# Patient Record
Sex: MALE | URBAN - METROPOLITAN AREA
[De-identification: names, ages, dates, MRNs, and addresses within clinical notes are randomized per-mention and may not be internally consistent; named-entity substitution may affect disease eponyms.]

---

## 2018-03-14 PROBLEM — 266435005 GASTRO-ESOPHAGEAL REFLUX DISEASE WITHOUT ESOPHAGITIS: Status: ACTIVE | Noted: 2018-03-14

## 2018-05-24 PROBLEM — 266433003 GASTRO-ESOPHAGEAL REFLUX DISEASE WITH ESOPHAGITIS: Status: ACTIVE | Noted: 2018-05-24

## 2020-10-07 ENCOUNTER — OFFICE VISIT (OUTPATIENT)
Dept: URBAN - METROPOLITAN AREA CLINIC 31 | Facility: CLINIC | Age: 65
End: 2020-10-07

## 2020-10-07 VITALS
HEIGHT: 72 IN | BODY MASS INDEX: 28.58 KG/M2 | SYSTOLIC BLOOD PRESSURE: 110 MMHG | HEART RATE: 78 BPM | WEIGHT: 211 LBS | OXYGEN SATURATION: 98 % | DIASTOLIC BLOOD PRESSURE: 70 MMHG

## 2020-10-07 RX ORDER — DICLOFENAC SODIUM 75 MG/1
1 TABLET TABLET, DELAYED RELEASE ORAL
Status: ACTIVE | COMMUNITY

## 2020-10-07 RX ORDER — OMEPRAZOLE 40 MG/1
TAKE 1 CAPSULE BEFORE BREAKFAST CAPSULE, DELAYED RELEASE ORAL QAM
Qty: 90 | Refills: 4

## 2020-10-07 RX ORDER — FLUTICASONE PROPIONATE 50 UG/1
1 SPRAY IN EACH NOSTRIL SPRAY, METERED NASAL ONCE A DAY
Status: ACTIVE | COMMUNITY

## 2020-10-07 RX ORDER — OLMESARTAN MEDOXOMIL 20 MG/1
1 TABLET TABLET, FILM COATED ORAL ONCE A DAY
Status: ACTIVE | COMMUNITY

## 2020-10-07 RX ORDER — OMEPRAZOLE 40 MG/1
TAKE 1 CAPSULE BEFORE BREAKFAST CAPSULE, DELAYED RELEASE ORAL QAM
Qty: 90 | Refills: 4 | Status: ACTIVE | COMMUNITY

## 2020-10-07 NOTE — HPI-MIGRATED HPI
;     [facility] : Patient presents today for a follow-up office visit from 10/02/2019. Patient continues taking Omeprazole 40 mg, once a day. He is NOT currently avoiding NSAID's. Patient is taking Diclofenac Sodium, 75 mg, to be taken once or twice a day. This was prescribed for knee and hip pain. Patient states that he is feeling well today with no complaints. Patient denies any heartburn, early satiety, or acid reflux. As long as he takes the Omeprazole, he does not report symptoms. Patient has some concerns of long tern use of Omeprazole and would like to discuss any possible side effects. He states that he is not sure if he has previously taken Pepcid, if he did, it was a long time ago.  ;

## 2021-10-06 ENCOUNTER — OFFICE VISIT (OUTPATIENT)
Dept: URBAN - METROPOLITAN AREA CLINIC 35 | Facility: CLINIC | Age: 66
End: 2021-10-06

## 2021-10-06 VITALS
SYSTOLIC BLOOD PRESSURE: 116 MMHG | DIASTOLIC BLOOD PRESSURE: 68 MMHG | HEART RATE: 83 BPM | HEIGHT: 72 IN | OXYGEN SATURATION: 96 % | WEIGHT: 217 LBS | BODY MASS INDEX: 29.39 KG/M2

## 2021-10-06 RX ORDER — ATORVASTATIN CALCIUM 20 MG/1
1 TABLET TABLET, FILM COATED ORAL ONCE A DAY
Qty: 30 | Status: ACTIVE | COMMUNITY

## 2021-10-06 RX ORDER — DICLOFENAC SODIUM 75 MG/1
1 TABLET TABLET, DELAYED RELEASE ORAL
Status: ACTIVE | COMMUNITY

## 2021-10-06 RX ORDER — OMEPRAZOLE 40 MG/1
TAKE 1 CAPSULE BEFORE BREAKFAST CAPSULE, DELAYED RELEASE ORAL QAM
Qty: 90 | Refills: 4

## 2021-10-06 RX ORDER — OLMESARTAN MEDOXOMIL 20 MG/1
1 TABLET TABLET, FILM COATED ORAL ONCE A DAY
Status: ACTIVE | COMMUNITY

## 2021-10-06 RX ORDER — FLUTICASONE PROPIONATE 50 UG/1
1 SPRAY IN EACH NOSTRIL SPRAY, METERED NASAL ONCE A DAY
Status: ACTIVE | COMMUNITY

## 2021-10-06 RX ORDER — OMEPRAZOLE 40 MG/1
TAKE 1 CAPSULE BEFORE BREAKFAST CAPSULE, DELAYED RELEASE ORAL QAM
Qty: 90 | Refills: 4 | Status: ACTIVE | COMMUNITY

## 2021-10-06 NOTE — HPI-MIGRATED HPI
;     [facility] : Patient presents today for a follow-up office visit from 10/07/2020. Patient continues taking Omeprazole 40 mg, once a day. He is NOT currently avoiding NSAID's. Patient is taking Diclofenac Sodium, 75 mg, to be taken once or twice a day. This was prescribed for knee pain and he us seeing a surgeon next week. Patient states that he is feeling well today with no complaints. Patient denies any heartburn, early satiety, or acid reflux. Patient did not complete the bone densitometry. He wonders if it is necessary as he does not have a known family history of osteoporosis. He is willing to get the test done, if recommended. Patient requests refills of Omeprazole 40 mg, daily, sent to confirmed pharmacy in EMR;

## 2021-11-08 ENCOUNTER — TELEPHONE ENCOUNTER (OUTPATIENT)
Dept: URBAN - METROPOLITAN AREA CLINIC 35 | Facility: CLINIC | Age: 66
End: 2021-11-08

## 2022-10-05 ENCOUNTER — OFFICE VISIT (OUTPATIENT)
Dept: URBAN - METROPOLITAN AREA CLINIC 35 | Facility: CLINIC | Age: 67
End: 2022-10-05

## 2022-11-09 ENCOUNTER — OFFICE VISIT (OUTPATIENT)
Dept: URBAN - METROPOLITAN AREA CLINIC 31 | Facility: CLINIC | Age: 67
End: 2022-11-09
Payer: MEDICARE

## 2022-11-09 VITALS
SYSTOLIC BLOOD PRESSURE: 116 MMHG | BODY MASS INDEX: 29.12 KG/M2 | WEIGHT: 215 LBS | HEIGHT: 72 IN | DIASTOLIC BLOOD PRESSURE: 70 MMHG | OXYGEN SATURATION: 97 % | HEART RATE: 97 BPM

## 2022-11-09 DIAGNOSIS — K29.81 DUODENITIS WITH BLEEDING: ICD-10-CM

## 2022-11-09 DIAGNOSIS — K22.70 BARRETT'S ESOPHAGUS WITHOUT DYSPLASIA: ICD-10-CM

## 2022-11-09 DIAGNOSIS — K21.00 GASTRO-ESOPHAGEAL REFLUX DISEASE WITH ESOPHAGITIS, WITHOUT BLEEDING: ICD-10-CM

## 2022-11-09 PROBLEM — 302914006 BARRETT'S ESOPHAGUS: Status: ACTIVE | Noted: 2018-03-14

## 2022-11-09 PROCEDURE — 99213 OFFICE O/P EST LOW 20 MIN: CPT | Performed by: INTERNAL MEDICINE

## 2022-11-09 RX ORDER — OMEPRAZOLE 40 MG/1
TAKE 1 CAPSULE BEFORE BREAKFAST CAPSULE, DELAYED RELEASE ORAL QAM
Qty: 90 | Refills: 4 | OUTPATIENT

## 2022-11-09 RX ORDER — FLUTICASONE PROPIONATE 50 UG/1
1 SPRAY IN EACH NOSTRIL SPRAY, METERED NASAL ONCE A DAY
Status: DISCONTINUED | COMMUNITY

## 2022-11-09 RX ORDER — ATORVASTATIN CALCIUM 20 MG/1
1 TABLET TABLET, FILM COATED ORAL ONCE A DAY
Qty: 30 | Status: ACTIVE | COMMUNITY

## 2022-11-09 RX ORDER — OMEPRAZOLE 40 MG/1
TAKE 1 CAPSULE BEFORE BREAKFAST CAPSULE, DELAYED RELEASE ORAL QAM
Qty: 90 | Refills: 4 | Status: DISCONTINUED | COMMUNITY

## 2022-11-09 RX ORDER — OLMESARTAN MEDOXOMIL 20 MG/1
1 TABLET TABLET, FILM COATED ORAL ONCE A DAY
Status: ACTIVE | COMMUNITY

## 2022-11-09 RX ORDER — DICLOFENAC SODIUM 75 MG/1
1 TABLET TABLET, DELAYED RELEASE ORAL
Status: DISCONTINUED | COMMUNITY

## 2022-11-09 NOTE — HPI-MIGRATED HPI
Patient presents today for a follow-up office visit from 10/06/2021. The bone density test was completed on 10/27/2021, and the results are in EMR. Patient discontinued taking Omeprazole 40 mg, 6 months ago. Patient states that he is feeling well today with no complaints. Patient denies any heartburn, early satiety, or acid reflux. He denies dysphagia. Patient is currently having 1 bowel movement per day with normal and formed stools. Patient denies any blood or mucous in his stool. He denies melena. Patient is due for a colonoscopy some time in 2023. He would also like to know when he needs to repeat the EGD.

## 2023-06-06 ENCOUNTER — OFFICE VISIT (OUTPATIENT)
Dept: URBAN - METROPOLITAN AREA CLINIC 35 | Facility: CLINIC | Age: 68
End: 2023-06-06
Payer: MEDICARE

## 2023-06-06 VITALS
HEIGHT: 72 IN | DIASTOLIC BLOOD PRESSURE: 78 MMHG | OXYGEN SATURATION: 97 % | WEIGHT: 212.6 LBS | HEART RATE: 75 BPM | SYSTOLIC BLOOD PRESSURE: 120 MMHG | BODY MASS INDEX: 28.79 KG/M2

## 2023-06-06 DIAGNOSIS — K29.81 DUODENITIS WITH BLEEDING: ICD-10-CM

## 2023-06-06 DIAGNOSIS — K21.00 GASTRO-ESOPHAGEAL REFLUX DISEASE WITH ESOPHAGITIS, WITHOUT BLEEDING: ICD-10-CM

## 2023-06-06 DIAGNOSIS — Z12.11 ENCOUNTER FOR COLORECTAL CANCER SCREENING: ICD-10-CM

## 2023-06-06 PROCEDURE — 99214 OFFICE O/P EST MOD 30 MIN: CPT | Performed by: INTERNAL MEDICINE

## 2023-06-06 RX ORDER — OLMESARTAN MEDOXOMIL 20 MG/1
1 TABLET TABLET, FILM COATED ORAL ONCE A DAY
Status: ACTIVE | COMMUNITY

## 2023-06-06 RX ORDER — ATORVASTATIN CALCIUM 20 MG/1
1 TABLET TABLET, FILM COATED ORAL ONCE A DAY
Qty: 30 | Status: ACTIVE | COMMUNITY

## 2023-06-06 RX ORDER — POLYETHYLENE GLYCOL 3350 17 G/17G
AS DIRECTED POWDER, FOR SOLUTION ORAL
OUTPATIENT
Start: 2023-06-06

## 2023-06-06 NOTE — HPI-GERD
Patient presents today for a follow-up of gerd. He remains off of Omeprazole. Patient denies any associated symptoms of heartburn,regurgitation, early satiety, nausea, and vomiting. He denies the use of NSAIDs. Patient's last EGD was done on 04/19/2018, with Dr. Linn.    Last Visit (11/09/2022) : Patient presents today for a follow-up office visit from 10/06/2021. The bone density test was completed on 10/27/2021, and the results are in EMR. Patient discontinued taking Omeprazole 40 mg, 6 months ago. Patient states that he is feeling well today with no complaints. Patient denies any heartburn, early satiety, or acid reflux. He denies dysphagia. Patient is currently having 1 bowel movement per day with normal and formed stools. Patient denies any blood or mucous in his stool. He denies melena. Patient is due for a colonoscopy some time in 2023. He would also like to know when he needs to repeat the EGD.

## 2023-06-06 NOTE — HPI-COLORECTAL CANCER SCREENING
Patient presents today for a colorectal cancer screening. Patient denies this will be his first colonoscopy. Patient states his last colonoscopy was 09.21.2013 by Dr. Linn. He denies a family history of colon, gastric, or esophageal cancer/polyps. Currently reports 1-2 bowel movements per day, without strain. His stools varies between loose/normal  without the presence of blood, mucus, or melena. He denies any episodes of rectal pain or pruritus ani.

## 2023-06-26 ENCOUNTER — TELEPHONE ENCOUNTER (OUTPATIENT)
Dept: URBAN - METROPOLITAN AREA CLINIC 31 | Facility: CLINIC | Age: 68
End: 2023-06-26

## 2023-06-28 ENCOUNTER — CLAIMS CREATED FROM THE CLAIM WINDOW (OUTPATIENT)
Dept: URBAN - METROPOLITAN AREA SURGERY CENTER 8 | Facility: SURGERY CENTER | Age: 68
End: 2023-06-28

## 2023-06-28 ENCOUNTER — CLAIMS CREATED FROM THE CLAIM WINDOW (OUTPATIENT)
Dept: URBAN - METROPOLITAN AREA CLINIC 4 | Facility: CLINIC | Age: 68
End: 2023-06-28
Payer: MEDICARE

## 2023-06-28 ENCOUNTER — CLAIMS CREATED FROM THE CLAIM WINDOW (OUTPATIENT)
Dept: URBAN - METROPOLITAN AREA SURGERY CENTER 8 | Facility: SURGERY CENTER | Age: 68
End: 2023-06-28
Payer: MEDICARE

## 2023-06-28 DIAGNOSIS — K29.70 GASTRITIS, UNSPECIFIED, WITHOUT BLEEDING: ICD-10-CM

## 2023-06-28 DIAGNOSIS — Z12.11 ENCOUNTER FOR COLORECTAL CANCER SCREENING: ICD-10-CM

## 2023-06-28 DIAGNOSIS — K31.89 ACQUIRED DEFORMITY OF DUODENUM: ICD-10-CM

## 2023-06-28 DIAGNOSIS — Z12.11 COLON CANCER SCREENING: ICD-10-CM

## 2023-06-28 DIAGNOSIS — D12.3 ADENOMA OF TRANSVERSE COLON: ICD-10-CM

## 2023-06-28 DIAGNOSIS — D12.3 BENIGN NEOPLASM OF TRANSVERSE COLON: ICD-10-CM

## 2023-06-28 DIAGNOSIS — K31.89 REACTIVE GASTROPATHY: ICD-10-CM

## 2023-06-28 DIAGNOSIS — K21.00 ESOPHAGITIS, REFLUX: ICD-10-CM

## 2023-06-28 DIAGNOSIS — K63.89 OTHER SPECIFIED DISEASES OF INTESTINE: ICD-10-CM

## 2023-06-28 DIAGNOSIS — K63.89 APPENDICITIS EPIPLOICA: ICD-10-CM

## 2023-06-28 PROCEDURE — 88312 SPECIAL STAINS GROUP 1: CPT | Performed by: PATHOLOGY

## 2023-06-28 PROCEDURE — 45385 COLONOSCOPY W/LESION REMOVAL: CPT | Performed by: INTERNAL MEDICINE

## 2023-06-28 PROCEDURE — 00813 ANES UPR LWR GI NDSC PX: CPT | Performed by: NURSE ANESTHETIST, CERTIFIED REGISTERED

## 2023-06-28 PROCEDURE — G8907 PT DOC NO EVENTS ON DISCHARG: HCPCS | Performed by: INTERNAL MEDICINE

## 2023-06-28 PROCEDURE — 88305 TISSUE EXAM BY PATHOLOGIST: CPT | Performed by: PATHOLOGY

## 2023-06-28 PROCEDURE — 43239 EGD BIOPSY SINGLE/MULTIPLE: CPT | Performed by: INTERNAL MEDICINE

## 2023-06-28 RX ORDER — POLYETHYLENE GLYCOL 3350 17 G/17G
AS DIRECTED POWDER, FOR SOLUTION ORAL
Status: ACTIVE | COMMUNITY
Start: 2023-06-06

## 2023-06-28 RX ORDER — OMEPRAZOLE 40 MG/1
1 CAPSULE 30 MINUTES BEFORE MORNING MEAL CAPSULE, DELAYED RELEASE ORAL ONCE A DAY
Qty: 90 | Refills: 3 | OUTPATIENT
Start: 2023-06-28

## 2023-06-28 RX ORDER — OLMESARTAN MEDOXOMIL 20 MG/1
1 TABLET TABLET, FILM COATED ORAL ONCE A DAY
Status: ACTIVE | COMMUNITY

## 2023-06-28 RX ORDER — ATORVASTATIN CALCIUM 20 MG/1
1 TABLET TABLET, FILM COATED ORAL ONCE A DAY
Qty: 30 | Status: ACTIVE | COMMUNITY

## 2023-06-30 ENCOUNTER — OUT OF OFFICE VISIT (OUTPATIENT)
Dept: URBAN - METROPOLITAN AREA SURGERY CENTER 8 | Facility: SURGERY CENTER | Age: 68
End: 2023-06-30

## 2023-07-17 ENCOUNTER — TELEPHONE ENCOUNTER (OUTPATIENT)
Dept: URBAN - METROPOLITAN AREA CLINIC 36 | Facility: CLINIC | Age: 68
End: 2023-07-17

## 2023-07-24 ENCOUNTER — TELEPHONE ENCOUNTER (OUTPATIENT)
Dept: URBAN - METROPOLITAN AREA CLINIC 36 | Facility: CLINIC | Age: 68
End: 2023-07-24

## 2023-07-24 RX ORDER — OMEPRAZOLE 40 MG/1
1 CAPSULE 30 MINUTES BEFORE MORNING MEAL CAPSULE, DELAYED RELEASE ORAL ONCE A DAY
Qty: 90 | Refills: 1
Start: 2023-06-28

## 2023-08-09 ENCOUNTER — OFFICE VISIT (OUTPATIENT)
Dept: URBAN - METROPOLITAN AREA CLINIC 31 | Facility: CLINIC | Age: 68
End: 2023-08-09

## 2023-10-17 ENCOUNTER — OFFICE VISIT (OUTPATIENT)
Dept: URBAN - METROPOLITAN AREA CLINIC 35 | Facility: CLINIC | Age: 68
End: 2023-10-17

## 2023-10-17 ENCOUNTER — TELEPHONE ENCOUNTER (OUTPATIENT)
Dept: URBAN - METROPOLITAN AREA CLINIC 35 | Facility: CLINIC | Age: 68
End: 2023-10-17

## 2023-10-17 RX ORDER — ATORVASTATIN CALCIUM 20 MG/1
1 TABLET TABLET, FILM COATED ORAL ONCE A DAY
Qty: 30 | Status: ACTIVE | COMMUNITY

## 2023-10-17 RX ORDER — OLMESARTAN MEDOXOMIL 20 MG/1
1 TABLET TABLET, FILM COATED ORAL ONCE A DAY
Status: ACTIVE | COMMUNITY

## 2023-10-17 RX ORDER — POLYETHYLENE GLYCOL 3350 17 G/17G
AS DIRECTED POWDER, FOR SOLUTION ORAL
Status: ON HOLD | COMMUNITY
Start: 2023-06-06

## 2023-10-17 RX ORDER — POLYETHYLENE GLYCOL 3350 17 G/17G
AS DIRECTED POWDER, FOR SOLUTION ORAL
OUTPATIENT

## 2023-10-17 RX ORDER — OMEPRAZOLE 40 MG/1
1 CAPSULE 30 MINUTES BEFORE MORNING MEAL CAPSULE, DELAYED RELEASE ORAL ONCE A DAY
Qty: 90 | Refills: 1 | Status: ACTIVE | COMMUNITY
Start: 2023-06-28

## 2023-10-17 NOTE — HPI-GERD
Patient present today for a follow-up of gerd and repeat EGD. He admits/denies taking any OTC or prescribe medications for relief of his symptoms. Patient states he has/not had any recent episodes of gerd at this time. Patient admits/denies any associated symptoms of dyspepsia, dysphagia, excessive belching, globus, sour eructations, bloating/gas, early satiety, changes in appetite, or coughing.  (Last Visit 06.06.2023) Patient presents today for a follow-up of gerd. He remains off of Omeprazole. Patient denies any associated symptoms of heartburn,regurgitation, early satiety, nausea, and vomiting. He denies the use of NSAIDs. Patient's last EGD was done on 04/19/2018, with Dr. Linn.    Last Visit (11/09/2022) : Patient presents today for a follow-up office visit from 10/06/2021. The bone density test was completed on 10/27/2021, and the results are in EMR. Patient discontinued taking Omeprazole 40 mg, 6 months ago. Patient states that he is feeling well today with no complaints. Patient denies any heartburn, early satiety, or acid reflux. He denies dysphagia. Patient is currently having 1 bowel movement per day with normal and formed stools. Patient denies any blood or mucous in his stool. He denies melena. Patient is due for a colonoscopy some time in 2023. He would also like to know when he needs to repeat the EGD.

## 2023-11-07 ENCOUNTER — OFFICE VISIT (OUTPATIENT)
Dept: URBAN - METROPOLITAN AREA CLINIC 35 | Facility: CLINIC | Age: 68
End: 2023-11-07
Payer: MEDICARE

## 2023-11-07 VITALS
OXYGEN SATURATION: 97 % | SYSTOLIC BLOOD PRESSURE: 126 MMHG | HEART RATE: 97 BPM | BODY MASS INDEX: 28.85 KG/M2 | WEIGHT: 213 LBS | DIASTOLIC BLOOD PRESSURE: 82 MMHG | HEIGHT: 72 IN

## 2023-11-07 DIAGNOSIS — D12.3 BENIGN NEOPLASM OF TRANSVERSE COLON: ICD-10-CM

## 2023-11-07 DIAGNOSIS — K21.00 GASTRO-ESOPHAGEAL REFLUX DISEASE WITH ESOPHAGITIS, WITHOUT BLEEDING: ICD-10-CM

## 2023-11-07 DIAGNOSIS — K29.81 DUODENITIS WITH BLEEDING: ICD-10-CM

## 2023-11-07 PROBLEM — 92448001: Status: ACTIVE | Noted: 2023-11-07

## 2023-11-07 PROCEDURE — 99213 OFFICE O/P EST LOW 20 MIN: CPT | Performed by: INTERNAL MEDICINE

## 2023-11-07 RX ORDER — OMEPRAZOLE 40 MG/1
1 CAPSULE 30 MINUTES BEFORE MORNING MEAL CAPSULE, DELAYED RELEASE ORAL ONCE A DAY
OUTPATIENT
Start: 2023-06-28

## 2023-11-07 RX ORDER — POLYETHYLENE GLYCOL 3350 17 G/17G
AS DIRECTED POWDER, FOR SOLUTION ORAL
Status: ON HOLD | COMMUNITY

## 2023-11-07 RX ORDER — TADALAFIL 5 MG/1
1 TABLET AS NEEDED TABLET, FILM COATED ORAL ONCE A DAY
Status: ACTIVE | COMMUNITY

## 2023-11-07 RX ORDER — OMEPRAZOLE 40 MG/1
1 CAPSULE 30 MINUTES BEFORE MORNING MEAL CAPSULE, DELAYED RELEASE ORAL ONCE A DAY
Qty: 90 | Refills: 1 | Status: ACTIVE | COMMUNITY
Start: 2023-06-28

## 2023-11-07 RX ORDER — RIVAROXABAN 15 MG/1
1 TABLET WITH FOOD TABLET, FILM COATED ORAL ONCE A DAY
Status: ACTIVE | COMMUNITY

## 2023-11-07 RX ORDER — OLMESARTAN MEDOXOMIL 20 MG/1
1 TABLET TABLET, FILM COATED ORAL ONCE A DAY
Status: ACTIVE | COMMUNITY

## 2023-11-07 RX ORDER — ATORVASTATIN CALCIUM 20 MG/1
1 TABLET TABLET, FILM COATED ORAL ONCE A DAY
Qty: 30 | Status: ACTIVE | COMMUNITY

## 2023-11-07 NOTE — HPI-GERD
Patient present today for a follow-up of gerd and repeat EGD. He admits taking Omeprazole 40 mg, daily. Patient states he has not had any recent episodes of GERD at this time. Patient denies any associated symptoms of dyspepsia, dysphagia, excessive belching, globus, sour eructations, bloating/gas, early satiety, changes in appetite, or coughing. Patient was diagnosed with a DVT at the end of September and is now taking Xarelto 15 mg daily.   (Last Visit 06.06.2023) Patient presents today for a follow-up of gerd. He remains off of Omeprazole. Patient denies any associated symptoms of heartburn,regurgitation, early satiety, nausea, and vomiting. He denies the use of NSAIDs. Patient's last EGD was done on 04/19/2018, with Dr. Linn.    Last Visit (11/09/2022) : Patient presents today for a follow-up office visit from 10/06/2021. The bone density test was completed on 10/27/2021, and the results are in EMR. Patient discontinued taking Omeprazole 40 mg, 6 months ago. Patient states that he is feeling well today with no complaints. Patient denies any heartburn, early satiety, or acid reflux. He denies dysphagia. Patient is currently having 1 bowel movement per day with normal and formed stools. Patient denies any blood or mucous in his stool. He denies melena. Patient is due for a colonoscopy some time in 2023. He would also like to know when he needs to repeat the EGD.

## 2024-04-30 ENCOUNTER — OV EP (OUTPATIENT)
Dept: URBAN - METROPOLITAN AREA CLINIC 35 | Facility: CLINIC | Age: 69
End: 2024-04-30

## 2024-04-30 VITALS
DIASTOLIC BLOOD PRESSURE: 78 MMHG | BODY MASS INDEX: 29.69 KG/M2 | WEIGHT: 219.2 LBS | HEIGHT: 72 IN | HEART RATE: 74 BPM | SYSTOLIC BLOOD PRESSURE: 116 MMHG | OXYGEN SATURATION: 98 %

## 2024-04-30 RX ORDER — OLMESARTAN MEDOXOMIL 20 MG/1
1 TABLET TABLET, FILM COATED ORAL ONCE A DAY
Status: ACTIVE | COMMUNITY

## 2024-04-30 RX ORDER — OMEPRAZOLE 40 MG/1
1 CAPSULE 30 MINUTES BEFORE MORNING MEAL CAPSULE, DELAYED RELEASE ORAL ONCE A DAY
OUTPATIENT

## 2024-04-30 RX ORDER — OMEPRAZOLE 40 MG/1
1 CAPSULE 30 MINUTES BEFORE MORNING MEAL CAPSULE, DELAYED RELEASE ORAL ONCE A DAY
Status: ACTIVE | COMMUNITY
Start: 2023-06-28

## 2024-04-30 RX ORDER — RIVAROXABAN 15 MG/1
1 TABLET WITH FOOD TABLET, FILM COATED ORAL ONCE A DAY
Status: ACTIVE | COMMUNITY

## 2024-04-30 RX ORDER — TADALAFIL 5 MG/1
1 TABLET AS NEEDED TABLET, FILM COATED ORAL ONCE A DAY
Status: ACTIVE | COMMUNITY

## 2024-04-30 RX ORDER — ATORVASTATIN CALCIUM 20 MG/1
1 TABLET TABLET, FILM COATED ORAL ONCE A DAY
Qty: 30 | Status: ACTIVE | COMMUNITY

## 2024-04-30 RX ORDER — POLYETHYLENE GLYCOL 3350 17 G/17G
AS DIRECTED POWDER, FOR SOLUTION ORAL
Status: ON HOLD | COMMUNITY

## 2024-04-30 NOTE — HPI-GERD
Patient present today for a 6 month follow-up of gerd. He denies any recent flares or issues with his symptoms at this time. Patient admits continued use of Omeprazole 40mg with relief of symptoms. He states he has not had any associated symptoms of indgestion, nausea, vomiting, dyspepsia, dysphagia, excessive belching, globus, sour eructations, bloating/gas, early satiety, or changes in appetite.  Most recent labs completed on 4.16.2024 which were all normal. Patient also had an GI Profile completed which was completed on 02.09.2024 which revealed Rotavirus A (Detected).  (Last Visit 11.07.2023) Patient present today for a follow-up of gerd and repeat EGD. He admits taking Omeprazole 40 mg, daily. Patient states he has not had any recent episodes of GERD at this time. Patient denies any associated symptoms of dyspepsia, dysphagia, excessive belching, globus, sour eructations, bloating/gas, early satiety, changes in appetite, or coughing. Patient was diagnosed with a DVT at the end of September and is now taking Xarelto 15 mg daily.   (Last Visit 06.06.2023) Patient presents today for a follow-up of gerd. He remains off of Omeprazole. Patient denies any associated symptoms of heartburn,regurgitation, early satiety, nausea, and vomiting. He denies the use of NSAIDs. Patient's last EGD was done on 04/19/2018, with Dr. Linn.    Last Visit (11/09/2022) : Patient presents today for a follow-up office visit from 10/06/2021. The bone density test was completed on 10/27/2021, and the results are in EMR. Patient discontinued taking Omeprazole 40 mg, 6 months ago. Patient states that he is feeling well today with no complaints. Patient denies any heartburn, early satiety, or acid reflux. He denies dysphagia. Patient is currently having 1 bowel movement per day with normal and formed stools. Patient denies any blood or mucous in his stool. He denies melena. Patient is due for a colonoscopy some time in 2023. He would also like to know when he needs to repeat the EGD.

## 2024-05-08 ENCOUNTER — OUT OF OFFICE VISIT (OUTPATIENT)
Dept: URBAN - METROPOLITAN AREA SURGERY CENTER 8 | Facility: SURGERY CENTER | Age: 69
End: 2024-05-08
Payer: MEDICARE

## 2024-05-08 ENCOUNTER — CLAIMS CREATED FROM THE CLAIM WINDOW (OUTPATIENT)
Dept: URBAN - METROPOLITAN AREA CLINIC 4 | Facility: CLINIC | Age: 69
End: 2024-05-08
Payer: MEDICARE

## 2024-05-08 DIAGNOSIS — K44.9 HERNIA, HIATAL: ICD-10-CM

## 2024-05-08 DIAGNOSIS — K21.9 GASTRO-ESOPHAGEAL REFLUX DISEASE WITHOUT ESOPHAGITIS: ICD-10-CM

## 2024-05-08 DIAGNOSIS — K31.89 OTHER DISEASES OF STOMACH AND DUODENUM: ICD-10-CM

## 2024-05-08 DIAGNOSIS — K21.9 GASTROESOPHAGEAL REFLUX DISEASE: ICD-10-CM

## 2024-05-08 DIAGNOSIS — K31.89 GASTRIC FOVEOLAR HYPERPLASIA: ICD-10-CM

## 2024-05-08 DIAGNOSIS — K21.9 GASTRO - ESOPHAGEAL REFLUX DISEASE: ICD-10-CM

## 2024-05-08 PROCEDURE — 88312 SPECIAL STAINS GROUP 1: CPT | Performed by: PATHOLOGY

## 2024-05-08 PROCEDURE — 00731 ANES UPR GI NDSC PX NOS: CPT | Performed by: NURSE ANESTHETIST, CERTIFIED REGISTERED

## 2024-05-08 PROCEDURE — 43239 EGD BIOPSY SINGLE/MULTIPLE: CPT | Performed by: CLINIC/CENTER

## 2024-05-08 PROCEDURE — G8907 PT DOC NO EVENTS ON DISCHARG: HCPCS | Performed by: CLINIC/CENTER

## 2024-05-08 PROCEDURE — 88305 TISSUE EXAM BY PATHOLOGIST: CPT | Performed by: PATHOLOGY

## 2024-05-08 PROCEDURE — 43239 EGD BIOPSY SINGLE/MULTIPLE: CPT | Performed by: INTERNAL MEDICINE

## 2024-06-03 ENCOUNTER — DASHBOARD ENCOUNTERS (OUTPATIENT)
Age: 69
End: 2024-06-03

## 2024-06-04 ENCOUNTER — OFFICE VISIT (OUTPATIENT)
Dept: URBAN - METROPOLITAN AREA CLINIC 35 | Facility: CLINIC | Age: 69
End: 2024-06-04
Payer: MEDICARE

## 2024-06-04 VITALS
OXYGEN SATURATION: 96 % | BODY MASS INDEX: 30.07 KG/M2 | DIASTOLIC BLOOD PRESSURE: 76 MMHG | HEIGHT: 72 IN | WEIGHT: 222 LBS | SYSTOLIC BLOOD PRESSURE: 122 MMHG | HEART RATE: 77 BPM

## 2024-06-04 DIAGNOSIS — K21.00 GASTRO-ESOPHAGEAL REFLUX DISEASE WITH ESOPHAGITIS, WITHOUT BLEEDING: ICD-10-CM

## 2024-06-04 DIAGNOSIS — D12.3 BENIGN NEOPLASM OF TRANSVERSE COLON: ICD-10-CM

## 2024-06-04 DIAGNOSIS — K29.81 DUODENITIS WITH BLEEDING: ICD-10-CM

## 2024-06-04 PROCEDURE — 99213 OFFICE O/P EST LOW 20 MIN: CPT | Performed by: INTERNAL MEDICINE

## 2024-06-04 RX ORDER — POLYETHYLENE GLYCOL 3350 17 G/17G
AS DIRECTED POWDER, FOR SOLUTION ORAL
Status: ON HOLD | COMMUNITY

## 2024-06-04 RX ORDER — RIVAROXABAN 15 MG/1
1 TABLET WITH FOOD TABLET, FILM COATED ORAL ONCE A DAY
Status: ACTIVE | COMMUNITY

## 2024-06-04 RX ORDER — ATORVASTATIN CALCIUM 20 MG/1
1 TABLET TABLET, FILM COATED ORAL ONCE A DAY
Qty: 30 | Status: ACTIVE | COMMUNITY

## 2024-06-04 RX ORDER — TADALAFIL 5 MG/1
1 TABLET AS NEEDED TABLET, FILM COATED ORAL ONCE A DAY
Status: ACTIVE | COMMUNITY

## 2024-06-04 RX ORDER — OMEPRAZOLE 40 MG/1
1 CAPSULE 30 MINUTES BEFORE MORNING MEAL CAPSULE, DELAYED RELEASE ORAL ONCE A DAY
OUTPATIENT

## 2024-06-04 RX ORDER — OLMESARTAN MEDOXOMIL 20 MG/1
1 TABLET TABLET, FILM COATED ORAL ONCE A DAY
Status: ACTIVE | COMMUNITY

## 2024-06-04 RX ORDER — OMEPRAZOLE 40 MG/1
1 CAPSULE 30 MINUTES BEFORE MORNING MEAL CAPSULE, DELAYED RELEASE ORAL ONCE A DAY
Status: ACTIVE | COMMUNITY

## 2024-06-04 NOTE — HPI-GERD
He continues to deny any recent flares with his GERD symptoms. Patient continues to take Omeprazole 40mg with/out control symptoms. He denies any associated symptoms of indigestion, heartburn, nausea, decrease in appetite, vomiting, dyspepsia, dysphagia, excessive belching, globus, sour eructations, bloating/gas, or early satiety.  (Last Visit 04.20.2024) Patient present today for a 6 month follow-up of gerd. He denies any recent flares or issues with his symptoms at this time. Patient admits continued use of Omeprazole 40mg with relief of symptoms. He states he has not had any associated symptoms of indgestion, nausea, vomiting, dyspepsia, dysphagia, excessive belching, globus, sour eructations, bloating/gas, early satiety, or changes in appetite.  Most recent labs completed on 4.16.2024 which were all normal. Patient also had an GI Profile completed which was completed on 02.09.2024 which revealed Rotavirus A (Detected).  (Last Visit 11.07.2023) Patient present today for a follow-up of gerd and repeat EGD. He admits taking Omeprazole 40 mg, daily. Patient states he has not had any recent episodes of GERD at this time. Patient denies any associated symptoms of dyspepsia, dysphagia, excessive belching, globus, sour eructations, bloating/gas, early satiety, changes in appetite, or coughing. Patient was diagnosed with a DVT at the end of September and is now taking Xarelto 15 mg daily.   (Last Visit 06.06.2023) Patient presents today for a follow-up of gerd. He remains off of Omeprazole. Patient denies any associated symptoms of heartburn,regurgitation, early satiety, nausea, and vomiting. He denies the use of NSAIDs. Patient's last EGD was done on 04/19/2018, with Dr. Linn.    Last Visit (11/09/2022) : Patient presents today for a follow-up office visit from 10/06/2021. The bone density test was completed on 10/27/2021, and the results are in EMR. Patient discontinued taking Omeprazole 40 mg, 6 months ago. Patient states that he is feeling well today with no complaints. Patient denies any heartburn, early satiety, or acid reflux. He denies dysphagia. Patient is currently having 1 bowel movement per day with normal and formed stools. Patient denies any blood or mucous in his stool. He denies melena. Patient is due for a colonoscopy some time in 2023. He would also like to know when he needs to repeat the EGD.

## 2024-06-04 NOTE — HPI-ENDOSCOPY (EGD) FOLLOWUP
Patient presents today for a follow up from his endoscopy. He denies any complications after his procedure. Since his procedure he denies any episodes of dysphagia, globus, a change in appetite or bowel habits.

## 2024-11-19 ENCOUNTER — TELEPHONE ENCOUNTER (OUTPATIENT)
Dept: URBAN - METROPOLITAN AREA CLINIC 36 | Facility: CLINIC | Age: 69
End: 2024-11-19

## 2024-11-25 ENCOUNTER — OFFICE VISIT (OUTPATIENT)
Dept: URBAN - METROPOLITAN AREA CLINIC 35 | Facility: CLINIC | Age: 69
End: 2024-11-25
Payer: MEDICARE

## 2024-11-25 VITALS
OXYGEN SATURATION: 96 % | HEART RATE: 85 BPM | WEIGHT: 218 LBS | DIASTOLIC BLOOD PRESSURE: 72 MMHG | BODY MASS INDEX: 29.53 KG/M2 | SYSTOLIC BLOOD PRESSURE: 118 MMHG | HEIGHT: 72 IN

## 2024-11-25 DIAGNOSIS — D12.3 BENIGN NEOPLASM OF TRANSVERSE COLON: ICD-10-CM

## 2024-11-25 DIAGNOSIS — K21.00 GASTRO-ESOPHAGEAL REFLUX DISEASE WITH ESOPHAGITIS, WITHOUT BLEEDING: ICD-10-CM

## 2024-11-25 DIAGNOSIS — K29.81 DUODENITIS WITH BLEEDING: ICD-10-CM

## 2024-11-25 DIAGNOSIS — K76.0 HEPATIC STEATOSIS: ICD-10-CM

## 2024-11-25 PROCEDURE — 99214 OFFICE O/P EST MOD 30 MIN: CPT | Performed by: INTERNAL MEDICINE

## 2024-11-25 PROCEDURE — 76981 USE PARENCHYMA: CPT | Performed by: INTERNAL MEDICINE

## 2024-11-25 RX ORDER — POLYETHYLENE GLYCOL 3350 17 G/17G
AS DIRECTED POWDER, FOR SOLUTION ORAL
Status: ON HOLD | COMMUNITY

## 2024-11-25 RX ORDER — RIVAROXABAN 10 MG/1
1 TABLET WITH FOOD TABLET, FILM COATED ORAL ONCE A DAY
Status: ACTIVE | COMMUNITY

## 2024-11-25 RX ORDER — TADALAFIL 5 MG/1
1 TABLET AS NEEDED TABLET, COATED ORAL ONCE A DAY
Status: ACTIVE | COMMUNITY

## 2024-11-25 RX ORDER — OMEPRAZOLE 40 MG/1
1 CAPSULE 30 MINUTES BEFORE MORNING MEAL CAPSULE, DELAYED RELEASE ORAL ONCE A DAY
Status: ACTIVE | COMMUNITY

## 2024-11-25 RX ORDER — ATORVASTATIN CALCIUM 20 MG/1
1 TABLET TABLET, FILM COATED ORAL ONCE A DAY
Qty: 30 | Status: ACTIVE | COMMUNITY

## 2024-11-25 RX ORDER — OMEPRAZOLE 40 MG/1
1 CAPSULE 30 MINUTES BEFORE MORNING MEAL CAPSULE, DELAYED RELEASE ORAL ONCE A DAY
OUTPATIENT

## 2024-11-25 RX ORDER — OLMESARTAN MEDOXOMIL 20 MG/1
1 TABLET TABLET, FILM COATED ORAL ONCE A DAY
Status: ACTIVE | COMMUNITY

## 2024-11-25 NOTE — HPI-ABNORMAL FINDINGS
Patient presents today to discuss the results from US abdomen complete done on 11/11/2024. The results are noted below. Patient's most recent labs were completed on 10/08/2024 and those results are also noted below. He denies a history of elevated liver enzymes, or any imaging indicating hepatic steatosis. He denies any jaundice, fatigue, dizziness, RUQ pain, bloating, easy bruising or chills.    Patient denies risk factors of drug use, travel outside the [place], NSAIDs (he used NSAIDs, fairly heavily, up until about 1 year ago), tattoos, piercings,  service, blood transfusion, family history of liver disease or cancer (patient does have 1 brother that is an alcoholic), personal exposure to liver diseases, longterm, rehab. He denies a family history of liver disease or cancer, (patient again states that he has 1 brother that is an alcoholic). Patient drinks 1 protein shake daily. He drinks, on average, 2 glasses of beer or wine, per week.   US abdomen complete done on 11/11/2024 :  IMPRESSION : No acute sonographic abnormalities. Hepatic steatosis.    Lab results from 10/08/2024 were as follows : CBC W/DIFF PLT, all results were normal except for the following, MCV was HIGH @ 99, MCV was HIGH @ 33.8. CMP, all results were normal except for the following, glucose was HIGH @ 101. D dimer was normal.

## 2024-11-25 NOTE — HPI-GERD
Patient admits/denies the continuation of Omeprazole 40 mg, daily. He admits/denies any breakthrough heartburn or regurgitation. Patient admits/denies any other associated symptoms at this time. He is here at today's office visit to discuss abnormal imaging results.   Last visit (06/04/2024) He continues to deny any recent flares with his GERD symptoms. Patient continues to take Omeprazole 40mg with controlled symptoms. He denies any associated symptoms of indigestion, heartburn, nausea, decrease in appetite, vomiting, dyspepsia, dysphagia, excessive belching, globus, sour eructations, bloating/gas, or early satiety.  (Last Visit 04.20.2024) Patient present today for a 6 month follow-up of gerd. He denies any recent flares or issues with his symptoms at this time. Patient admits continued use of Omeprazole 40mg with relief of symptoms. He states he has not had any associated symptoms of indgestion, nausea, vomiting, dyspepsia, dysphagia, excessive belching, globus, sour eructations, bloating/gas, early satiety, or changes in appetite.  Most recent labs completed on 4.16.2024 which were all normal. Patient also had an GI Profile completed which was completed on 02.09.2024 which revealed Rotavirus A (Detected).  (Last Visit 11.07.2023) Patient present today for a follow-up of gerd and repeat EGD. He admits taking Omeprazole 40 mg, daily. Patient states he has not had any recent episodes of GERD at this time. Patient denies any associated symptoms of dyspepsia, dysphagia, excessive belching, globus, sour eructations, bloating/gas, early satiety, changes in appetite, or coughing. Patient was diagnosed with a DVT at the end of September and is now taking Xarelto 15 mg daily.   (Last Visit 06.06.2023) Patient presents today for a follow-up of gerd. He remains off of Omeprazole. Patient denies any associated symptoms of heartburn,regurgitation, early satiety, nausea, and vomiting. He denies the use of NSAIDs. Patient's last EGD was done on 04/19/2018, with Dr. Linn.    Last Visit (11/09/2022) : Patient presents today for a follow-up office visit from 10/06/2021. The bone density test was completed on 10/27/2021, and the results are in EMR. Patient discontinued taking Omeprazole 40 mg, 6 months ago. Patient states that he is feeling well today with no complaints. Patient denies any heartburn, early satiety, or acid reflux. He denies dysphagia. Patient is currently having 1 bowel movement per day with normal and formed stools. Patient denies any blood or mucous in his stool. He denies melena. Patient is due for a colonoscopy some time in 2023. He would also like to know when he needs to repeat the EGD.

## 2024-12-06 ENCOUNTER — TELEPHONE ENCOUNTER (OUTPATIENT)
Dept: URBAN - METROPOLITAN AREA CLINIC 35 | Facility: CLINIC | Age: 69
End: 2024-12-06

## 2024-12-13 ENCOUNTER — TELEPHONE ENCOUNTER (OUTPATIENT)
Dept: URBAN - METROPOLITAN AREA CLINIC 36 | Facility: CLINIC | Age: 69
End: 2024-12-13

## 2025-01-21 ENCOUNTER — OFFICE VISIT (OUTPATIENT)
Dept: URBAN - METROPOLITAN AREA CLINIC 35 | Facility: CLINIC | Age: 70
End: 2025-01-21

## 2025-01-22 ENCOUNTER — OFFICE VISIT (OUTPATIENT)
Dept: URBAN - METROPOLITAN AREA TELEHEALTH 2 | Facility: TELEHEALTH | Age: 70
End: 2025-01-22
Payer: MEDICARE

## 2025-01-22 ENCOUNTER — OFFICE VISIT (OUTPATIENT)
Dept: URBAN - METROPOLITAN AREA CLINIC 33 | Facility: CLINIC | Age: 70
End: 2025-01-22

## 2025-01-22 ENCOUNTER — TELEPHONE ENCOUNTER (OUTPATIENT)
Dept: URBAN - METROPOLITAN AREA CLINIC 35 | Facility: CLINIC | Age: 70
End: 2025-01-22

## 2025-01-22 DIAGNOSIS — K29.81 DUODENITIS WITH BLEEDING: ICD-10-CM

## 2025-01-22 DIAGNOSIS — K21.00 GASTRO-ESOPHAGEAL REFLUX DISEASE WITH ESOPHAGITIS, WITHOUT BLEEDING: ICD-10-CM

## 2025-01-22 DIAGNOSIS — K76.0 HEPATIC STEATOSIS: ICD-10-CM

## 2025-01-22 DIAGNOSIS — D12.3 BENIGN NEOPLASM OF TRANSVERSE COLON: ICD-10-CM

## 2025-01-22 PROCEDURE — 99443 PHONE E/M BY PHYS 21-30 MIN: CPT | Performed by: PHYSICIAN ASSISTANT

## 2025-01-22 PROCEDURE — 99213 OFFICE O/P EST LOW 20 MIN: CPT | Performed by: PHYSICIAN ASSISTANT

## 2025-01-22 RX ORDER — OMEPRAZOLE 40 MG/1
1 CAPSULE 30 MINUTES BEFORE MORNING MEAL CAPSULE, DELAYED RELEASE ORAL ONCE A DAY
Status: ACTIVE | COMMUNITY

## 2025-01-22 RX ORDER — OMEPRAZOLE 40 MG/1
1 CAPSULE 30 MINUTES BEFORE MORNING MEAL CAPSULE, DELAYED RELEASE ORAL ONCE A DAY
OUTPATIENT

## 2025-01-22 RX ORDER — POLYETHYLENE GLYCOL 3350 17 G/DOSE
AS DIRECTED POWDER (GRAM) ORAL
Status: ON HOLD | COMMUNITY

## 2025-01-22 RX ORDER — RIVAROXABAN 10 MG/1
1 TABLET WITH FOOD TABLET, FILM COATED ORAL ONCE A DAY
Status: ACTIVE | COMMUNITY

## 2025-01-22 RX ORDER — ATORVASTATIN CALCIUM 20 MG/1
1 TABLET TABLET, FILM COATED ORAL ONCE A DAY
Qty: 30 | Status: ACTIVE | COMMUNITY

## 2025-01-22 RX ORDER — OLMESARTAN MEDOXOMIL 20 MG/1
1 TABLET TABLET, FILM COATED ORAL ONCE A DAY
Status: ACTIVE | COMMUNITY

## 2025-01-22 RX ORDER — TADALAFIL 5 MG/1
1 TABLET AS NEEDED TABLET, COATED ORAL ONCE A DAY
Status: ACTIVE | COMMUNITY

## 2025-01-22 NOTE — HPI-GERD
Patient admits the continuation of Omeprazole 40 mg, daily. He admits/denies any breakthrough heartburn or regurgitation. Patient denies any other associated symptoms at this time.        (Last Visit 11.25.2024) Patient admits/denies the continuation of Omeprazole 40 mg, daily. He admits/denies any breakthrough heartburn or regurgitation. Patient admits/denies any other associated symptoms at this time. He is here at today's office visit to discuss abnormal imaging results.     Visit (06/04/2024) He continues to deny any recent flares with his GERD symptoms. Patient continues to take Omeprazole 40mg with controlled symptoms. He denies any associated symptoms of indigestion, heartburn, nausea, decrease in appetite, vomiting, dyspepsia, dysphagia, excessive belching, globus, sour eructations, bloating/gas, or early satiety. (Last Visit 04.20.2024) Patient present today for a 6 month follow-up of gerd. He denies any recent flares or issues with his symptoms at this time. Patient admits continued use of Omeprazole 40mg with relief of symptoms. He states he has not had any associated symptoms of indgestion, nausea, vomiting, dyspepsia, dysphagia, excessive belching, globus, sour eructations, bloating/gas, early satiety, or changes in appetite. Most recent labs completed on 4.16.2024 which were all normal. Patient also had an GI Profile completed which was completed on 02.09.2024 which revealed Rotavirus A (Detected).        (Last Visit 11.07.2023) Patient present today for a follow-up of gerd and repeat EGD. He admits taking Omeprazole 40 mg, daily. Patient states he has not had any recent episodes of GERD at this time. Patient denies any associated symptoms of dyspepsia, dysphagia, excessive belching, globus, sour eructations, bloating/gas, early satiety, changes in appetite, or coughing. Patient was diagnosed with a DVT at the end of September and is now taking Xarelto 15 mg daily. (Last Visit 06.06.2023) Patient presents today for a follow-up of gerd. He remains off of Omeprazole. Patient denies any associated symptoms of heartburn,regurgitation, early satiety, nausea, and vomiting. He denies the use of NSAIDs. Patient's last EGD was done on 04/19/2018, with Dr. Linn.       Last Visit (11/09/2022) : Patient presents today for a follow-up office visit from 10/06/2021. The bone density test was completed on 10/27/2021, and the results are in EMR. Patient discontinued taking Omeprazole 40 mg, 6 months ago. Patient states that he is feeling well today with no complaints. Patient denies any heartburn, early satiety, or acid reflux. He denies dysphagia. Patient is currently having 1 bowel movement per day with normal and formed stools. Patient denies any blood or mucous in his stool. He denies melena.   Patient is due for a colonoscopy some time in 2023. He would also like to know when he needs to repeat the EGD.

## 2025-01-22 NOTE — HPI-ABNORMAL FINDINGS
Patient presents today for a follow up.  He denies any jaundice, fatigue, dizziness, RUQ pain, bloating, easy bruising or chills.    Most recent las completed on 08/20/2024 CMP All within normal range except Glucose@106H, , Hemoglobin A1c @5.8H, Lipid Panel All within normal range except Triglyceride @ 212H, TSH normal.        FIBRO Scan: IMPRESSION: 1. Median liver stiffness value of 3.5 kPa. High probability of being normal. 2. Slightly echogenic hepatic parenchyma suggesting fatty infiltration.        (Last Visit 11.25.2024) Patient presents today to discuss the results from US abdomen complete done on 11/11/2024. The results are noted below. Patient's most recent labs were completed on 10/08/2024 and those results are also noted below. He denies a history of elevated liver enzymes, or any imaging indicating hepatic steatosis. He denies any jaundice, fatigue, dizziness, RUQ pain, bloating, easy bruising or chills.       Patient denies risk factors of drug use, travel outside the [place], NSAIDs (he used NSAIDs, fairly heavily, up until about 1 year ago), tattoos, piercings,  service, blood transfusion, family history of liver disease or cancer (patient does have 1 brother that is an alcoholic), personal exposure to liver diseases, retirement, rehab. He denies a family history of liver disease or cancer, (patient again states that he has 1 brother that is an alcoholic). Patient drinks 1 protein shake daily. He drinks, on average, 2 glasses of beer or wine, per week.    US abdomen complete done on 11/11/2024 : IMPRESSION : No acute sonographic abnormalities. Hepatic steatosis. Lab results from 10/08/2024 were as follows : CBC W/DIFF PLT, all results were normal except for the following, MCV was HIGH @ 99, MCV was HIGH @ 33.8. CMP, all results were normal except for the following, glucose was HIGH @ 101. D dimer was normal.

## 2025-01-22 NOTE — HPI-ABNORMAL FINDINGS
He admits/denies any jaundice, fatigue, dizziness, RUQ pain, bloating, easy bruising or chills.  Most recent las completed on 08/20/2024  CMP All within normal range except  Glucose@106H, , Hemoglobin A1c @5.8H, Lipid Panel All within normal range except Triglyceride @ 212H, TSH normal.  FIBRO Scan: IMPRESSION: 1. Median liver stiffness value of 3.5 kPa. High probability of being normal. 2. Slightly echogenic hepatic parenchyma suggesting fatty infiltration.  (Last VIsti 11.25.2024) Patient presents today to discuss the results from US abdomen complete done on 11/11/2024. The results are noted below. Patient's most recent labs were completed on 10/08/2024 and those results are also noted below. He denies a history of elevated liver enzymes, or any imaging indicating hepatic steatosis. He denies any jaundice, fatigue, dizziness, RUQ pain, bloating, easy bruising or chills.    Patient denies risk factors of drug use, travel outside the [place], NSAIDs (he used NSAIDs, fairly heavily, up until about 1 year ago), tattoos, piercings,  service, blood transfusion, family history of liver disease or cancer (patient does have 1 brother that is an alcoholic), personal exposure to liver diseases, [facility], rehab. He denies a family history of liver disease or cancer, (patient again states that he has 1 brother that is an alcoholic). Patient drinks 1 protein shake daily. He drinks, on average, 2 glasses of beer or wine, per week.   US abdomen complete done on 11/11/2024 :  IMPRESSION : No acute sonographic abnormalities. Hepatic steatosis.    Lab results from 10/08/2024 were as follows : CBC W/DIFF PLT, all results were normal except for the following, MCV was HIGH @ 99, MCV was HIGH @ 33.8. CMP, all results were normal except for the following, glucose was HIGH @ 101. D dimer was normal.

## 2025-01-22 NOTE — HPI-GERD
Patient admits/denies the continuation of Omeprazole 40 mg, daily. He admits/denies any breakthrough heartburn or regurgitation. Patient admits/denies any other associated symptoms at this time.   (Last Visit 11.25.2024) Patient admits/denies the continuation of Omeprazole 40 mg, daily. He admits/denies any breakthrough heartburn or regurgitation. Patient admits/denies any other associated symptoms at this time. He is here at today's office visit to discuss abnormal imaging results.   Last visit (06/04/2024) He continues to deny any recent flares with his GERD symptoms. Patient continues to take Omeprazole 40mg with controlled symptoms. He denies any associated symptoms of indigestion, heartburn, nausea, decrease in appetite, vomiting, dyspepsia, dysphagia, excessive belching, globus, sour eructations, bloating/gas, or early satiety.  (Last Visit 04.20.2024) Patient present today for a 6 month follow-up of gerd. He denies any recent flares or issues with his symptoms at this time. Patient admits continued use of Omeprazole 40mg with relief of symptoms. He states he has not had any associated symptoms of indgestion, nausea, vomiting, dyspepsia, dysphagia, excessive belching, globus, sour eructations, bloating/gas, early satiety, or changes in appetite.  Most recent labs completed on 4.16.2024 which were all normal. Patient also had an GI Profile completed which was completed on 02.09.2024 which revealed Rotavirus A (Detected).  (Last Visit 11.07.2023) Patient present today for a follow-up of gerd and repeat EGD. He admits taking Omeprazole 40 mg, daily. Patient states he has not had any recent episodes of GERD at this time. Patient denies any associated symptoms of dyspepsia, dysphagia, excessive belching, globus, sour eructations, bloating/gas, early satiety, changes in appetite, or coughing. Patient was diagnosed with a DVT at the end of September and is now taking Xarelto 15 mg daily.   (Last Visit 06.06.2023) Patient presents today for a follow-up of gerd. He remains off of Omeprazole. Patient denies any associated symptoms of heartburn,regurgitation, early satiety, nausea, and vomiting. He denies the use of NSAIDs. Patient's last EGD was done on 04/19/2018, with Dr. Linn.    Last Visit (11/09/2022) : Patient presents today for a follow-up office visit from 10/06/2021. The bone density test was completed on 10/27/2021, and the results are in EMR. Patient discontinued taking Omeprazole 40 mg, 6 months ago. Patient states that he is feeling well today with no complaints. Patient denies any heartburn, early satiety, or acid reflux. He denies dysphagia. Patient is currently having 1 bowel movement per day with normal and formed stools. Patient denies any blood or mucous in his stool. He denies melena. Patient is due for a colonoscopy some time in 2023. He would also like to know when he needs to repeat the EGD.

## 2025-06-03 ENCOUNTER — OFFICE VISIT (OUTPATIENT)
Dept: URBAN - METROPOLITAN AREA CLINIC 35 | Facility: CLINIC | Age: 70
End: 2025-06-03